# Patient Record
Sex: MALE | Race: WHITE | Employment: FULL TIME | URBAN - NONMETROPOLITAN AREA
[De-identification: names, ages, dates, MRNs, and addresses within clinical notes are randomized per-mention and may not be internally consistent; named-entity substitution may affect disease eponyms.]

---

## 2023-01-29 ENCOUNTER — OFFICE VISIT (OUTPATIENT)
Dept: FAMILY MEDICINE | Facility: CLINIC | Age: 43
End: 2023-01-29
Payer: COMMERCIAL

## 2023-01-29 VITALS
OXYGEN SATURATION: 99 % | WEIGHT: 165 LBS | SYSTOLIC BLOOD PRESSURE: 132 MMHG | DIASTOLIC BLOOD PRESSURE: 84 MMHG | HEART RATE: 66 BPM | TEMPERATURE: 99 F | BODY MASS INDEX: 20.51 KG/M2 | HEIGHT: 75 IN

## 2023-01-29 DIAGNOSIS — M71.032: Primary | ICD-10-CM

## 2023-01-29 PROCEDURE — 99204 PR OFFICE/OUTPT VISIT, NEW, LEVL IV, 45-59 MIN: ICD-10-PCS | Mod: ,,, | Performed by: NURSE PRACTITIONER

## 2023-01-29 PROCEDURE — 99051 PR MEDICAL SERVICES, EVE/WKEND/HOLIDAY: ICD-10-PCS | Mod: ,,, | Performed by: NURSE PRACTITIONER

## 2023-01-29 PROCEDURE — 99204 OFFICE O/P NEW MOD 45 MIN: CPT | Mod: ,,, | Performed by: NURSE PRACTITIONER

## 2023-01-29 PROCEDURE — 99051 MED SERV EVE/WKEND/HOLIDAY: CPT | Mod: ,,, | Performed by: NURSE PRACTITIONER

## 2023-01-29 RX ORDER — AMOXICILLIN AND CLAVULANATE POTASSIUM 875; 125 MG/1; MG/1
1 TABLET, FILM COATED ORAL EVERY 12 HOURS
Qty: 20 TABLET | Refills: 0 | Status: SHIPPED | OUTPATIENT
Start: 2023-01-29 | End: 2023-01-29 | Stop reason: SDUPTHER

## 2023-01-29 RX ORDER — AMOXICILLIN AND CLAVULANATE POTASSIUM 875; 125 MG/1; MG/1
1 TABLET, FILM COATED ORAL EVERY 12 HOURS
Qty: 20 TABLET | Refills: 0 | Status: SHIPPED | OUTPATIENT
Start: 2023-01-29

## 2023-01-29 NOTE — PROGRESS NOTES
"[facility]    Chief Complaint      Chief Complaint   Patient presents with    staph     Left wrist  Pt states started off as a pimple and progressed in size.   Painful, inflamed, yellow drainage present      History of Present Illness      Elva Agarwal is a 43 y.o. male  who presents today for c/o left wrist pain abscess that that has gradually worsened over the past 24 hours. He states that the are started as a "pimple" that he scratched.     Abscess  Chronicity:  NewProgression Since Onset: gradually worsening  Abscess location: left wrist.  Associated Symptoms: no fever, no chills, no sweats  Characteristics: draining and painful    Characteristics: no redness    Pain Scale:  7/10  Treatments Tried:  NSAIDs (cleaned with hydrogen peroxide)    Past Medical History:  History reviewed. No pertinent past medical history.    Past Surgical History:   has no past surgical history on file.    Social History:  Social History     Tobacco Use    Smoking status: Every Day     Packs/day: 0.50     Types: Cigarettes    Smokeless tobacco: Never     I personally reviewed all past medical, surgical, and social.     Review of Systems   Constitutional:  Negative for chills and fever.      Medications:  Outpatient Encounter Medications as of 1/29/2023   Medication Sig Dispense Refill    amoxicillin-clavulanate 875-125mg (AUGMENTIN) 875-125 mg per tablet Take 1 tablet by mouth every 12 (twelve) hours. 20 tablet 0     No facility-administered encounter medications on file as of 1/29/2023.     Allergies:  Review of patient's allergies indicates:  No Known Allergies    Health Maintenance:    There is no immunization history on file for this patient.   Health Maintenance   Topic Date Due    Hepatitis C Screening  Never done    Lipid Panel  Never done    TETANUS VACCINE  Never done      Physical Exam      Vital Signs  Temp: 98.6 °F (37 °C)  Pulse: 66  SpO2: 99 %  BP: 132/84  Pain Score:   7  Height and Weight  Height: 6' 3" " (190.5 cm)  Weight: 74.8 kg (165 lb)  BSA (Calculated - sq m): 1.99 sq meters  BMI (Calculated): 20.6  Weight in (lb) to have BMI = 25: 199.6]    Physical Exam  Vitals and nursing note reviewed.   Constitutional:       Appearance: Normal appearance.   HENT:      Head: Normocephalic.      Right Ear: External ear normal.      Left Ear: External ear normal.      Nose: Nose normal.      Mouth/Throat:      Mouth: Mucous membranes are moist.   Eyes:      Conjunctiva/sclera: Conjunctivae normal.   Cardiovascular:      Rate and Rhythm: Normal rate and regular rhythm.      Pulses: Normal pulses.      Heart sounds: Normal heart sounds. No murmur heard.  Pulmonary:      Effort: Pulmonary effort is normal. No respiratory distress.      Breath sounds: Normal breath sounds.   Abdominal:      General: Bowel sounds are normal.   Musculoskeletal:      Left wrist: Swelling and tenderness present. Decreased range of motion.        Hands:       Cervical back: Normal range of motion.   Skin:     General: Skin is warm.      Capillary Refill: Capillary refill takes less than 2 seconds.   Neurological:      Mental Status: He is alert and oriented to person, place, and time.   Psychiatric:         Mood and Affect: Mood normal.         Behavior: Behavior normal.         Thought Content: Thought content normal.         Judgment: Judgment normal.      Assessment/Plan     Elva Agarwal is a 43 y.o.male with:    1. Abscess of bursa, left wrist  - amoxicillin-clavulanate 875-125mg (AUGMENTIN) 875-125 mg per tablet; Take 1 tablet by mouth every 12 (twelve) hours.  Dispense: 20 tablet; Refill: 0  - Ambulatory referral/consult to Orthopedics; Future   -Go to ER for worsening pain, swelling, and redness.   -Wash area twice a day with soap and water and pat dry.   -Do not attempt to drain area.     Chronic conditions status updated as per HPI.  Other than changes above, cont current medications and maintain follow up with specialists.  Return to  clinic as needed.    Heike Matos, FNP  [facility]

## 2023-01-29 NOTE — PATIENT INSTRUCTIONS
-Go to ER for worsening pain, swelling, and redness.   -Wash area twice a day with soap and water and pat dry.   -Do not attempt to drain area.    No